# Patient Record
Sex: MALE
[De-identification: names, ages, dates, MRNs, and addresses within clinical notes are randomized per-mention and may not be internally consistent; named-entity substitution may affect disease eponyms.]

---

## 2019-07-01 ENCOUNTER — HOSPITAL ENCOUNTER (OUTPATIENT)
Dept: HOSPITAL 89 - CT | Age: 74
End: 2019-07-01
Attending: ORTHOPAEDIC SURGERY
Payer: MEDICARE

## 2019-07-01 DIAGNOSIS — M25.512: Primary | ICD-10-CM

## 2019-07-01 DIAGNOSIS — M19.012: ICD-10-CM

## 2019-07-01 NOTE — RADIOLOGY IMAGING REPORT
FACILITY: [facility] - [place] 

 

PATIENT NAME: Shashank Graves

: 1945

MR: [id number]

V: [id number]

EXAM DATE: 

ORDERING PHYSICIAN: RIZWANA HOLDER

TECHNOLOGIST: 

 

[place] [facility] [place]

Patient: Shashank Graves

: 1945

MRN: [id number]

Visit/Account:[id number]

Date of Sevice:  2019

 

ACCESSION #: [id number]

 

CT left shoulder

 

Indication: Left shoulder pain.  Degenerative joint disease.

 

Comparison: None available.

 

Technique: Axial CT images were obtained through the left shoulder. Reformatted coronal and sagittal 
images were reviewed.

One of the following dose optimization techniques was utilized in the performance of this exam: autom
ated exposure control; adjustment of the mA and/or kV according to the patient's size; or use of an i
terative reconstruction technique.  Specific details can be referenced in the facility's radiology CT
 exam operational policy.

 

Findings:

There is prominent vacuum phenomena within the left glenohumeral joint secondary to full-thickness ch
ondral loss throughout the glenohumeral joint with areas of subchondral sclerosis as well as multifoc
al subchondral cystic changes which are degenerative in nature.  Large osteophyte off the inferomedia
l margin of the left humeral head/neck junction.

No acute or aggressive osseous abnormality.

 

Limited views left lung field unremarkable.

 

There are mild osteoarthritic changes of the acromioclavicular joint noted.

 

The integrity of the rotator cuff is difficult to evaluate without intra-articular contrast.  There i
s no severe fatty atrophy of the visualized muscles rotator cuff on these images.

 

 

 

 

 

IMPRESSION:

1.  Severe osteoarthritic changes of the left glenohumeral joint as described above.

 

Report Dictated By: Van Raya MD at 2019 12:00 PM

 

Report E-Signed By: Van Raya MD  at 2019 12:02 PM

 

WSN:[id number]